# Patient Record
Sex: MALE | Race: WHITE
[De-identification: names, ages, dates, MRNs, and addresses within clinical notes are randomized per-mention and may not be internally consistent; named-entity substitution may affect disease eponyms.]

---

## 2018-08-17 ENCOUNTER — HOSPITAL ENCOUNTER (EMERGENCY)
Dept: HOSPITAL 54 - ER | Age: 38
Discharge: HOME | End: 2018-08-17
Payer: SELF-PAY

## 2018-08-17 VITALS — DIASTOLIC BLOOD PRESSURE: 96 MMHG | SYSTOLIC BLOOD PRESSURE: 139 MMHG

## 2018-08-17 VITALS — BODY MASS INDEX: 21.84 KG/M2 | WEIGHT: 156 LBS | HEIGHT: 71 IN

## 2018-08-17 DIAGNOSIS — F19.10: ICD-10-CM

## 2018-08-17 DIAGNOSIS — Z60.2: ICD-10-CM

## 2018-08-17 DIAGNOSIS — F41.9: Primary | ICD-10-CM

## 2018-08-17 PROCEDURE — A6402 STERILE GAUZE <= 16 SQ IN: HCPCS

## 2018-08-17 PROCEDURE — Z7610: HCPCS

## 2018-08-17 PROCEDURE — 99284 EMERGENCY DEPT VISIT MOD MDM: CPT

## 2018-08-17 PROCEDURE — A4606 OXYGEN PROBE USED W OXIMETER: HCPCS

## 2018-08-17 SDOH — SOCIAL STABILITY - SOCIAL INSECURITY: PROBLEMS RELATED TO LIVING ALONE: Z60.2

## 2018-08-17 NOTE — NUR
PATIENT LEFT ED WITHOUT RECEIVING DC PAPERWORK. MD MADE AWARE. PT LAST SEEN 
WALKING OUT AT 2138 WITH STEADY GAIT NOTED.